# Patient Record
Sex: FEMALE | NOT HISPANIC OR LATINO | URBAN - METROPOLITAN AREA
[De-identification: names, ages, dates, MRNs, and addresses within clinical notes are randomized per-mention and may not be internally consistent; named-entity substitution may affect disease eponyms.]

---

## 2021-06-13 NOTE — PROGRESS NOTES
Chief Complaint - tonsillitis    History of Present Illness - Gricel Falcon is a 20 year old female with chronic tonsillitis and recurrent acute tonsillitis. The patient describes bouts of significant sore throat with swelling of the tonsils. This happens every 4-6 weeks for days at a time. [facility] and [facility], sometimes gets antibiotics. It gets better, but only temporarily. This has been going on for 2 years. The patient has had no positive strep tests in the past few years.  In between acute flairs the patient notes chronic cough, phlegm in throat. Mostly clear mucous. Also noted is snoring. Sleeping is good. No personal or family history of bleeding disorders.    Past Medical History -   healthy    Current Medications -   Current Outpatient Medications:      cetirizine (ZYRTEC) 10 MG tablet, Take 10 mg by mouth daily, Disp: , Rfl:     Allergies - No Known Allergies    Social History -   Social History     Socioeconomic History     Marital status: Single     Spouse name: Not on file     Number of children: Not on file     Years of education: Not on file     Highest education level: Not on file   Occupational History     Not on file   Social Needs     Financial resource strain: Not on file     Food insecurity     Worry: Not on file     Inability: Not on file     Transportation needs     Medical: Not on file     Non-medical: Not on file   Tobacco Use     Smoking status: Not on file   Substance and Sexual Activity     Alcohol use: Not on file     Drug use: Not on file     Sexual activity: Not on file   Lifestyle     Physical activity     Days per week: Not on file     Minutes per session: Not on file     Stress: Not on file   Relationships     Social connections     Talks on phone: Not on file     Gets together: Not on file     Attends Yarsani service: Not on file     Active member of club or organization: Not on file     Attends meetings of clubs or organizations: Not on file      Relationship status: Not on file     Intimate partner violence     Fear of current or ex partner: Not on file     Emotionally abused: Not on file     Physically abused: Not on file     Forced sexual activity: Not on file   Other Topics Concern     Not on file   Social History Narrative     Not on file   nonsmoker    Family History - sister had tonsils and adenoids removed.     Review of Systems - As per HPI and PMHx, otherwise 7 system review of the head and neck is negative.    Physical Exam  Pulse 125   Resp 18   Wt 93 kg (205 lb)   SpO2 100%   General - The patient is in no distress.  Alert and oriented, answers questions and cooperates with examination appropriately.   Voice and Breathing - The patient was breathing comfortably without the use of accessory muscles. There was no wheezing, stridor, or stertor.  The patients voice was clear and strong.  Eyes - Extraocular movements intact. Sclera were not icteric or injected, conjunctiva were pink and moist.  Neurologic - Cranial nerves II-XII are grossly intact. Specifically, the facial nerve is intact, House-Brackmann grade 1 of 6.   Nose - No significant external deformity.  Nasal mucosa is pink and moist with no abnormal mucus.  The septum was midline, turbinates are of normal size and position.  No polyps, masses, or purulence.  Mouth - Examination of the oral cavity showed pink, healthy oral mucosa. No lesions or ulcerations noted.  The tongue was mobile and protrudes midline.  Oropharynx - The walls of the oropharynx were smooth, pink, moist, symmetric, and had no lesions or ulcerations.  The tonsils were 2-3+, no exudate or erythema today. Some excess oropharyngeal mucous, clear or white. The uvula was midline and the palate raised symmetrically.   Neck -  Soft, non-tender. Palpation of the occipital, submental, submandibular, internal jugular chain, and supraclavicular nodes did not demonstrate any abnormal lymph nodes or masses. The parotid glands  were without masses. Palpation of the thyroid was soft and smooth, with no nodules or goiter appreciated.  The trachea was midline.    A/P - Grciel Falcon is a 20 year old female with recurrent acute tonsillitis, and probably developing chronic tonsillitis. She also has an element of laryngopharyngeal reflux disease. It maybe difficult to determine how much each problem is contributing to her symptoms. I recommend prilosec 20 mg daily and diet and lifestyle changes. I gave her a handout. If after the prilosec treatment for 4-6 weeks she is still having tonsil problems we discussed tonsillectomy. The remainder of the visit was spent discussing the procedure.    I explained the risks, benefits, and alternatives of tonsillectomy including, but not, limited to: bleeding, possible need to go back to the OR to control bleeding, pain, and that tonsillectomy will not cure sore throats secondary to other causes such as viral upper respiratory infection and laryngopharyngeal reflux. I also discussed the risks of general anesthesia. I also explained the likely need for narcotic (opioid) pain medication that increases the risk of dependency. The patient will need to wean off the medication as soon as possible, and Tylenol and ibuprofen medication are preferred. Return in 4-6 weeks.      Song Espino MD  Otolaryngology  [facility]

## 2021-06-15 ENCOUNTER — OFFICE VISIT (OUTPATIENT)
Dept: OTOLARYNGOLOGY | Facility: CLINIC | Age: 21
End: 2021-06-15
Payer: COMMERCIAL

## 2021-06-15 VITALS — HEART RATE: 125 BPM | WEIGHT: 205 LBS | OXYGEN SATURATION: 100 % | RESPIRATION RATE: 18 BRPM

## 2021-06-15 DIAGNOSIS — J02.9 SORE THROAT: ICD-10-CM

## 2021-06-15 DIAGNOSIS — K21.9 LPRD (LARYNGOPHARYNGEAL REFLUX DISEASE): Primary | ICD-10-CM

## 2021-06-15 PROCEDURE — 99204 OFFICE O/P NEW MOD 45 MIN: CPT | Performed by: OTOLARYNGOLOGY

## 2021-06-15 RX ORDER — CETIRIZINE HYDROCHLORIDE 10 MG/1
10 TABLET ORAL DAILY
COMMUNITY

## 2021-06-15 NOTE — LETTER
6/15/2021         RE: Gricel Falcon  [address] [id number]        Dear Colleague,    Thank you for referring your patient, Gricel Falcon, to the [facility]. Please see a copy of my visit note below.    Chief Complaint - tonsillitis    History of Present Illness - Gricel Falcon is a 20 year old female with chronic tonsillitis and recurrent acute tonsillitis. The patient describes bouts of significant sore throat with swelling of the tonsils. This happens every 4-6 weeks for days at a time. [facility], sometimes gets antibiotics. It gets better, but only temporarily. This has been going on for 2 years. The patient has had no positive strep tests in the past few years.  In between acute flairs the patient notes chronic cough, phlegm in throat. Mostly clear mucous. Also noted is snoring. Sleeping is good. No personal or family history of bleeding disorders.    Past Medical History -   healthy    Current Medications -   Current Outpatient Medications:      cetirizine (ZYRTEC) 10 MG tablet, Take 10 mg by mouth daily, Disp: , Rfl:     Allergies - No Known Allergies    Social History -   Social History     Socioeconomic History     Marital status: Single     Spouse name: Not on file     Number of children: Not on file     Years of education: Not on file     Highest education level: Not on file   Occupational History     Not on file   Social Needs     Financial resource strain: Not on file     Food insecurity     Worry: Not on file     Inability: Not on file     Transportation needs     Medical: Not on file     Non-medical: Not on file   Tobacco Use     Smoking status: Not on file   Substance and Sexual Activity     Alcohol use: Not on file     Drug use: Not on file     Sexual activity: Not on file   Lifestyle     Physical activity     Days per week: Not on file     Minutes per session: Not on file     Stress: Not on file    Relationships     Social connections     Talks on phone: Not on file     Gets together: Not on file     Attends Confucianism service: Not on file     Active member of club or organization: Not on file     Attends meetings of clubs or organizations: Not on file     Relationship status: Not on file     Intimate partner violence     Fear of current or ex partner: Not on file     Emotionally abused: Not on file     Physically abused: Not on file     Forced sexual activity: Not on file   Other Topics Concern     Not on file   Social History Narrative     Not on file   nonsmoker    Family History - sister had tonsils and adenoids removed.     Review of Systems - As per HPI and PMHx, otherwise 7 system review of the head and neck is negative.    Physical Exam  Pulse 125   Resp 18   Wt 93 kg (205 lb)   SpO2 100%   General - The patient is in no distress.  Alert and oriented, answers questions and cooperates with examination appropriately.   Voice and Breathing - The patient was breathing comfortably without the use of accessory muscles. There was no wheezing, stridor, or stertor.  The patients voice was clear and strong.  Eyes - Extraocular movements intact. Sclera were not icteric or injected, conjunctiva were pink and moist.  Neurologic - Cranial nerves II-XII are grossly intact. Specifically, the facial nerve is intact, House-Brackmann grade 1 of 6.   Nose - No significant external deformity.  Nasal mucosa is pink and moist with no abnormal mucus.  The septum was midline, turbinates are of normal size and position.  No polyps, masses, or purulence.  Mouth - Examination of the oral cavity showed pink, healthy oral mucosa. No lesions or ulcerations noted.  The tongue was mobile and protrudes midline.  Oropharynx - The walls of the oropharynx were smooth, pink, moist, symmetric, and had no lesions or ulcerations.  The tonsils were 2-3+, no exudate or erythema today. Some excess oropharyngeal mucous, clear or white. The  uvula was midline and the palate raised symmetrically.   Neck -  Soft, non-tender. Palpation of the occipital, submental, submandibular, internal jugular chain, and supraclavicular nodes did not demonstrate any abnormal lymph nodes or masses. The parotid glands were without masses. Palpation of the thyroid was soft and smooth, with no nodules or goiter appreciated.  The trachea was midline.    A/P - Gricel Falcon is a 20 year old female with recurrent acute tonsillitis, and probably developing chronic tonsillitis. She also has an element of laryngopharyngeal reflux disease. It maybe difficult to determine how much each problem is contributing to her symptoms. I recommend prilosec 20 mg daily and diet and lifestyle changes. I gave her a handout. If after the prilosec treatment for 4-6 weeks she is still having tonsil problems we discussed tonsillectomy. The remainder of the visit was spent discussing the procedure.    I explained the risks, benefits, and alternatives of tonsillectomy including, but not, limited to: bleeding, possible need to go back to the OR to control bleeding, pain, and that tonsillectomy will not cure sore throats secondary to other causes such as viral upper respiratory infection and laryngopharyngeal reflux. I also discussed the risks of general anesthesia. I also explained the likely need for narcotic (opioid) pain medication that increases the risk of dependency. The patient will need to wean off the medication as soon as possible, and Tylenol and ibuprofen medication are preferred. Return in 4-6 weeks.      Song Espino MD  Otolaryngology  [facility]              Again, thank you for allowing me to participate in the care of your patient.        Sincerely,        Song Espino MD

## 2021-08-04 ENCOUNTER — TELEPHONE (OUTPATIENT)
Dept: DERMATOLOGY | Facility: CLINIC | Age: 21
End: 2021-08-04

## 2021-08-04 ENCOUNTER — OFFICE VISIT (OUTPATIENT)
Dept: DERMATOLOGY | Facility: CLINIC | Age: 21
End: 2021-08-04
Payer: COMMERCIAL

## 2021-08-04 VITALS — DIASTOLIC BLOOD PRESSURE: 80 MMHG | SYSTOLIC BLOOD PRESSURE: 125 MMHG | HEART RATE: 79 BPM

## 2021-08-04 DIAGNOSIS — L70.0 ACNE VULGARIS: Primary | ICD-10-CM

## 2021-08-04 PROCEDURE — 99203 OFFICE O/P NEW LOW 30 MIN: CPT | Performed by: PHYSICIAN ASSISTANT

## 2021-08-04 RX ORDER — TRETINOIN 1 MG/G
CREAM TOPICAL
Qty: 90 G | Refills: 4 | Status: SHIPPED | OUTPATIENT
Start: 2021-08-04

## 2021-08-04 RX ORDER — DOXYCYCLINE HYCLATE 100 MG
TABLET ORAL
Qty: 60 TABLET | Refills: 2 | Status: SHIPPED | OUTPATIENT
Start: 2021-08-04

## 2021-08-04 RX ORDER — TRETINOIN 0.5 MG/G
CREAM TOPICAL
Qty: 45 G | Refills: 3 | Status: SHIPPED | OUTPATIENT
Start: 2021-08-04

## 2021-08-04 RX ORDER — BENZOYL PEROXIDE 100 MG/ML
LIQUID TOPICAL
Qty: 226 G | Refills: 4 | Status: SHIPPED | OUTPATIENT
Start: 2021-08-04

## 2021-08-04 NOTE — PATIENT INSTRUCTIONS
Proper skin care from [facility]:    -Eliminate harsh soaps as they strip the natural oils from the skin, often resulting in dry itchy skin ( i.e. Dial, Zest, Shruti Spring)  -Use mild soaps such as Cetaphil or Dove Sensitive Skin in the shower. You do not need to use soap on arms, legs, and trunk every time you shower unless visibly soiled.   -Avoid hot or cold showers.  -After showering, lightly dry off and apply moisturizing within 2-3 minutes. This will help trap moisture in the skin.   -Aggressive use of a moisturizer at least 1-2 times a day to the entire body (including -Vanicream, Cetaphil, Aquaphor or Cerave) and moisturize hands after every washing.  -We recommend using moisturizers that come in a tub that needs to be scooped out, not a pump. This has more of an oil base. It will hold moisture in your skin much better than a water base moisturizer. The above recommended are non-pore clogging.      Wear a sunscreen with at least SPF 30 on your face, ears, neck and V of the chest daily. Wear sunscreen on other areas of the body if those areas are exposed to the sun throughout the day. Sunscreens can contain physical and/or chemical blockers. Physical blockers are less likely to clog pores, these include zinc oxide and titanium dioxide. Reapply every two hour and after swimming. Sunscreen examples include Neutrogena, CeraVe, Blue Lizard, Elta MD and many others.    UV radiation  UVA radiation remains constant throughout the day and throughout the year. It is a longer wavelength than UVB and therefore penetrates deeper into the skin leading to immediate and delayed tanning, photoaging, and skin cancer. 70-80% of UVA and UVB radiation occurs between the hours of 10am-2pm.  UVB radiation  UVB radiation causes the most harmful effects and is more significant during the summer months. However, snow and ice can reflect UVB radiation leading to skin damage during the winter months as well. UVB radiation is  responsible for tanning, burning, inflammation, delayed erythema (pinkness), pigmentation (brown spots), and skin cancer.     I recommend self monthly full body exams and yearly full body exams with a dermatology provider. If you develop a new or changing lesion please follow up for examination. Most skin cancers are pink and scaly or pink and pearly. However, we do see blue/brown/black skin cancers.  Consider the ABCDEs of melanoma when giving yourself your monthly full body exam ( don't forget the groin, buttocks, feet, toes, etc). A-asymmetry, B-borders, C-color, D-diameter, E-elevation or evolving. If you see any of these changes please follow up in clinic. If you cannot see your back I recommend purchasing a hand held mirror to use with a larger wall mirror.          Acne vulgaris, PIH    Morning regimen:    Wash with either a gentle cleanser such as Cetaphil gentle cleanser or Benzoyl peroxide wash 10%  Apply a gentle moisturizer*  Take Doxycyline with food     Evening regimen:    Wash with either a gentle cleanser such as Cetaphil gentle cleanser or Benzoyl peroxide wash 10%  Apply tretinoin 0.05% to face and tretinoin 0.1% to chest, shoulders and back   Apply a gentle moisturizer (do not need sunscreen at night)  Take Doxycyline with food    *Facial moisturizer (preferably with an SPF of 30 or greater) such as Cetaphil, CeraVe, or Vanicream. Make sure lotion is non-comedogenic. Sunscreen active ingredients: Physical blockers are less likely to clog pores. Examples include titanium dioxide and zinc oxide  Good body moisturizers include Cetaphil, CeraVe, Eucerin, and Vanicream.    Disc Tretinoin at bedtime, dryness, irritation and way to prevent discussed with patient   Benzoyl Peroxide (BPO) wash daily or every other day depending on dryness  (2.5%-5% BPO on face and 5%-10% on chest and back)  Aggressive use of bland emollients discussed with patient   If taking Doxycycline take with food but avoid  calcium-rich foods as this can reduce the efficacy of Doxycycline. Side effects of doxycyline GI upset, esophagitis, and sun sensitivity.   Potential side effects of oral antibiotic N/V/D, rash, allergic reaction, pigment changes, and dizziness.     May take 2-3 months to see 50-70% improvement  May get worse during initial phase of treatment    Pathophysiology discussed with patient and information provided.  I discussed with patient oral versus topical treatments such as oral antibiotics, Spironolactone (Aldactone)(women only),oral contraceptive pills (women only) topical creams,  and over-the-counter treatments.     Acne can be effectively treated, although response may sometimes be slow.   Where possible, avoid excessively humid conditions such as a sauna, working in an unventilated kitchen or tropical vacations.   If you smoke, stop. Nicotine increases sebum retention and increased scale within the follicles, forming comedones (black and whiteheads).    Minimize the application of oils and cosmetics to the affected skin.  Abrasive skin treatments can aggravate acne.   Try not to scratch or pick the spots as this can increase your risk for permanent scarring in the area.   To avoid sunburn, protect your skin outdoors using a broad spectrum (UVB and UVA) sunscreen and protective clothing.  No relationship between particular foods and acne has been proven. However, reports suggest low glycemic and low dairy diet are helpful for some people.    ACNE INFORMATION     Acne is a skin disease affecting oil glands and hair follicles in your skin.  When these pores do not drain properly, hair follicles can becomes clogged and bacteria can be trapped causing inflammation to occur. Clinical manifestations range from mild to severe, such as comedones (whiteheads and blackheads) or cysts.     Several factors contribute to acne:     1. Hormonal- Androgen (a type of hormone) can cause oil glands to enlarges and produces more  sebum (oil).    2. Bacterial- A specific type of bacteria called Propionibacterium acnes are found in these oily follicles and stimulate more inflammation.     3. Genetics- History of family members (parents or siblings)     4. External factors- mechanical trauma, cosmetics, topical steroids or some oral medications.      Combination therapy is the mainstay of treatment given the multiple factors contributing to acne.  The type of treatment is also dependant on whether you are pregnancy or breastfeeding.     TOPICAL TREATMENTS   Topical Antibiotics These medications help prevent growth of bacteria in your pores.   Topical Exfoliating Agent These are drying agents that will help normalize oil production, unplug pores and reduce bacterial growth. (Note: Make sure you discontinue this medication ONE week prior to waxing or your skin may lift.)     ORAL TREATMENTS   Oral Antibiotics: Tetracyclines are the most commonly used oral antibiotics to treat moderate to severe acne. They are safe to take for months at a time. Some side effects to these medications include: sun sensitivity, stomach upset, dizziness and heartburn. If you experience a sudden onset of rash or severe unusual headache, discontinue the medication and contact your clinician immediately.     Spironolactone: This oral medication blocks androgens (hormones that can aggravate acne).  Since this medication is also used as a diuretic, baseline blood work is needed to check your electrolytes and liver function. Do not get pregnant while on this medication as it can cause birth defects. Make sure to drink plenty of water.    Birth Control Pills (Females only). In order to decrease hormonal fluctuations that affect acne, birth control pills such as Sherri  or Emilia  ( and others) can be effective in treating acne.  We advise you to discuss with your OB/GYN or Primary Care Doctor to be screened and to check if you are eligible to be on this pill.     Accutane   (generic: isotretinoin). This oral medication is a retinoid (Vitamin A derivative like Retin-A) used to treat severe acne that does not respond to the above medications. Accutane  can  help clear acne for years and the average period of treatment is five to seven months, however, the duration of treatment may be adjusted based on severity. Some people may need more than one treatment.     ALTERNATIVE TREATMENTS   Microdermabrasion & light chemical peels help improve skin texture, decrease pore size, decrease mild scarring & increase absorption of your topical treatments     Blue Spectrum Light Therapy consists of a concentrated visible light that kills bacteria in the oil ducts. It is safe for pregnancy & nursing. At times, ALA (a topical solution) may be applied prior to the Blue Light exposure in order to enhance light penetration.     Pulse Dye Laser (PDL) decreases inflammation and improves certain acne scars.     Recommended facial cleansers, moisturizers & cosmetics:   Cetaphil  CeraVe  (Note: Look for  non-comeodogenic  cleansers & moisturizers)   Clinique , Prescriptives  make-up

## 2021-08-04 NOTE — TELEPHONE ENCOUNTER
Prior Authorization Specialty Medication Request    Medication/Dose: tretinoin 0.1% cream  ICD code (if different than what is on RX):

## 2021-08-04 NOTE — LETTER
8/4/2021         RE: Gricel Falcon  [address] [facility] [address]        Dear Colleague,    Thank you for referring your patient, Gricel Falcon, to the [facility]. Please see a copy of my visit note below.    HPI:  Gricel Falcon is a 20 year old female patient here today for acne on face, chest, shoulders and back  .  Patient states this has been present for years.  Patient reports the following symptoms: breakout .  Patient reports the following previous treatments: otc with no change.  Patient reports the following modifying factors: none.  Associated symptoms: none. Not worse with menses. Has Nexplanon. Also has dry scaly bumps on arms.  Patient has no other skin complaints today.  Remainder of the HPI, Meds, PMH, Allergies, FH, and SH was reviewed in chart.      History reviewed. No pertinent past medical history.    History reviewed. No pertinent surgical history.     History reviewed. No pertinent family history.    Social History     Socioeconomic History     Marital status: Single     Spouse name: Not on file     Number of children: Not on file     Years of education: Not on file     Highest education level: Not on file   Occupational History     Not on file   Tobacco Use     Smoking status: Never Smoker     Smokeless tobacco: Never Used   Substance and Sexual Activity     Alcohol use: Not on file     Drug use: Not on file     Sexual activity: Not on file   Other Topics Concern     Not on file   Social History Narrative     Not on file     Social Determinants of Health     Financial Resource Strain:      Difficulty of Paying Living Expenses:    Food Insecurity:      Worried About Running Out of Food in the Last Year:      Ran Out of Food in the Last Year:    Transportation Needs:      Lack of Transportation (Medical):      Lack of Transportation (Non-Medical):    Physical Activity:      Days of Exercise per Week:      Minutes of Exercise per Session:     Stress:      Feeling of Stress :    Social Connections:      Frequency of Communication with Friends and Family:      Frequency of Social Gatherings with Friends and Family:      Attends Christian Services:      Active Member of Clubs or Organizations:      Attends Club or Organization Meetings:      Marital Status:    Intimate Partner Violence:      Fear of Current or Ex-Partner:      Emotionally Abused:      Physically Abused:      Sexually Abused:        Outpatient Encounter Medications as of 8/4/2021   Medication Sig Dispense Refill     cetirizine (ZYRTEC) 10 MG tablet Take 10 mg by mouth daily       etonogestrel (NEXPLANON) 68 MG IMPL 1 each by Subdermal route once       omeprazole (PRILOSEC) 20 MG DR capsule Take 1 capsule (20 mg) by mouth daily 30 capsule 1     No facility-administered encounter medications on file as of 8/4/2021.       Review Of Systems:  Skin: acne, bumps  Eyes: negative  Ears/Nose/Throat: negative  Respiratory: No shortness of breath, dyspnea on exertion, cough, or hemoptysis  Cardiovascular: negative  Gastrointestinal: negative  Genitourinary: negative  Musculoskeletal: negative  Neurologic: negative  Psychiatric: negative  Hematologic/Lymphatic/Immunologic: negative  Endocrine: negative      Objective:     /80   Pulse 79   Eyes: Conjunctivae/lids: Normal   ENT: Lips:  Normal  MSK: Normal  Cardiovascular: Peripheral edema none  Pulm: Breathing Normal  Neuro/Psych: Orientation: A/O x 3 Normal; Mood/Affect: Normal, NAD, WDWN  Pt accompanied by: self  Following areas examined: face, neck, shoulders, chest, back  Maria skin type:ii   Findings:  Red/purple 1.0mm scaly papules on lateral arms  Numerous inflammatory papules on chest, face, shoulders and upper back  Light brown/pink smooth macules  Open comedones on face  Assessment and Plan:  1) Acne vulgaris, PIH    Morning regimen:    Wash with either a gentle cleanser such as Cetaphil gentle cleanser or Benzoyl peroxide wash  10%  Apply a gentle moisturizer*  Take Doxycyline with food     Evening regimen:    Wash with either a gentle cleanser such as Cetaphil gentle cleanser or Benzoyl peroxide wash 10%  Apply tretinoin 0.05% to face and tretinoin 0.1% to chest, shoulders and back   Apply a gentle moisturizer (do not need sunscreen at night)  Take Doxycyline with food    *Facial moisturizer (preferably with an SPF of 30 or greater) such as Cetaphil, CeraVe, or Vanicream. Make sure lotion is non-comedogenic. Sunscreen active ingredients: Physical blockers are less likely to clog pores. Examples include titanium dioxide and zinc oxide  Good body moisturizers include Cetaphil, CeraVe, Eucerin, and Vanicream.    Disc Tretinoin at bedtime, dryness, irritation and way to prevent discussed with patient   Benzoyl Peroxide (BPO) wash daily or every other day depending on dryness  (2.5%-5% BPO on face and 5%-10% on chest and back)  Aggressive use of bland emollients discussed with patient   If taking Doxycycline take with food but avoid calcium-rich foods as this can reduce the efficacy of Doxycycline. Side effects of doxycyline GI upset, esophagitis, and sun sensitivity.   Potential side effects of oral antibiotic N/V/D, rash, allergic reaction, pigment changes, and dizziness.     May take 2-3 months to see 50-70% improvement  May get worse during initial phase of treatment    Pathophysiology discussed with patient and information provided.  I discussed with patient oral versus topical treatments such as oral antibiotics, Spironolactone (Aldactone)(women only),oral contraceptive pills (women only) topical creams,  and over-the-counter treatments.     Acne can be effectively treated, although response may sometimes be slow.   Where possible, avoid excessively humid conditions such as a sauna, working in an unventilated kitchen or tropical vacations.   If you smoke, stop. Nicotine increases sebum retention and increased scale within the follicles,  forming comedones (black and whiteheads).    Minimize the application of oils and cosmetics to the affected skin.  Abrasive skin treatments can aggravate acne.   Try not to scratch or pick the spots as this can increase your risk for permanent scarring in the area.   To avoid sunburn, protect your skin outdoors using a broad spectrum (UVB and UVA) sunscreen and protective clothing.  No relationship between particular foods and acne has been proven. However, reports suggest low glycemic and low dairy diet are helpful for some people.  It was a pleasure speaking with Gricel Falcon today.   Follow up in on Thanksgiving mateo. Pt attends college in [place].         Again, thank you for allowing me to participate in the care of your patient.        Sincerely,        Mayte Simmons PA-C

## 2021-08-04 NOTE — PROGRESS NOTES
HPI:  Gricel Falcon is a 20 year old female patient here today for acne on face, chest, shoulders and back  .  Patient states this has been present for years.  Patient reports the following symptoms: breakout .  Patient reports the following previous treatments: otc with no change.  Patient reports the following modifying factors: none.  Associated symptoms: none. Not worse with menses. Has Nexplanon. Also has dry scaly bumps on arms.  Patient has no other skin complaints today.  Remainder of the HPI, Meds, PMH, Allergies, FH, and SH was reviewed in chart.      History reviewed. No pertinent past medical history.    History reviewed. No pertinent surgical history.     History reviewed. No pertinent family history.    Social History     Socioeconomic History     Marital status: Single     Spouse name: Not on file     Number of children: Not on file     Years of education: Not on file     Highest education level: Not on file   Occupational History     Not on file   Tobacco Use     Smoking status: Never Smoker     Smokeless tobacco: Never Used   Substance and Sexual Activity     Alcohol use: Not on file     Drug use: Not on file     Sexual activity: Not on file   Other Topics Concern     Not on file   Social History Narrative     Not on file     Social Determinants of Health     Financial Resource Strain:      Difficulty of Paying Living Expenses:    Food Insecurity:      Worried About Running Out of Food in the Last Year:      Ran Out of Food in the Last Year:    Transportation Needs:      Lack of Transportation (Medical):      Lack of Transportation (Non-Medical):    Physical Activity:      Days of Exercise per Week:      Minutes of Exercise per Session:    Stress:      Feeling of Stress :    Social Connections:      Frequency of Communication with Friends and Family:      Frequency of Social Gatherings with Friends and Family:      Attends [employer]ism Services:      Active Member of Clubs or Organizations:       Attends Club or Organization Meetings:      Marital Status:    Intimate Partner Violence:      Fear of Current or Ex-Partner:      Emotionally Abused:      Physically Abused:      Sexually Abused:        Outpatient Encounter Medications as of 8/4/2021   Medication Sig Dispense Refill     cetirizine (ZYRTEC) 10 MG tablet Take 10 mg by mouth daily       etonogestrel (NEXPLANON) 68 MG IMPL 1 each by Subdermal route once       omeprazole (PRILOSEC) 20 MG DR capsule Take 1 capsule (20 mg) by mouth daily 30 capsule 1     No facility-administered encounter medications on file as of 8/4/2021.       Review Of Systems:  Skin: acne, bumps  Eyes: negative  Ears/Nose/Throat: negative  Respiratory: No shortness of breath, dyspnea on exertion, cough, or hemoptysis  Cardiovascular: negative  Gastrointestinal: negative  Genitourinary: negative  Musculoskeletal: negative  Neurologic: negative  Psychiatric: negative  Hematologic/Lymphatic/Immunologic: negative  Endocrine: negative      Objective:     /80   Pulse 79   Eyes: Conjunctivae/lids: Normal   ENT: Lips:  Normal  MSK: Normal  Cardiovascular: Peripheral edema none  Pulm: Breathing Normal  Neuro/Psych: Orientation: A/O x 3 Normal; Mood/Affect: Normal, NAD, WDWN  Pt accompanied by: self  Following areas examined: face, neck, shoulders, chest, back  Maria skin type:ii   Findings:  Red/purple 1.0mm scaly papules on lateral arms  Numerous inflammatory papules on chest, face, shoulders and upper back  Light brown/pink smooth macules  Open comedones on face  Assessment and Plan:  1) Acne vulgaris, PIH    Morning regimen:    Wash with either a gentle cleanser such as Cetaphil gentle cleanser or Benzoyl peroxide wash 10%  Apply a gentle moisturizer*  Take Doxycyline with food     Evening regimen:    Wash with either a gentle cleanser such as Cetaphil gentle cleanser or Benzoyl peroxide wash 10%  Apply tretinoin 0.05% to face and tretinoin 0.1% to chest, shoulders and back    Apply a gentle moisturizer (do not need sunscreen at night)  Take Doxycyline with food    *Facial moisturizer (preferably with an SPF of 30 or greater) such as Cetaphil, CeraVe, or Vanicream. Make sure lotion is non-comedogenic. Sunscreen active ingredients: Physical blockers are less likely to clog pores. Examples include titanium dioxide and zinc oxide  Good body moisturizers include Cetaphil, CeraVe, Eucerin, and Vanicream.    Disc Tretinoin at bedtime, dryness, irritation and way to prevent discussed with patient   Benzoyl Peroxide (BPO) wash daily or every other day depending on dryness  (2.5%-5% BPO on face and 5%-10% on chest and back)  Aggressive use of bland emollients discussed with patient   If taking Doxycycline take with food but avoid calcium-rich foods as this can reduce the efficacy of Doxycycline. Side effects of doxycyline GI upset, esophagitis, and sun sensitivity.   Potential side effects of oral antibiotic N/V/D, rash, allergic reaction, pigment changes, and dizziness.     May take 2-3 months to see 50-70% improvement  May get worse during initial phase of treatment    Pathophysiology discussed with patient and information provided.  I discussed with patient oral versus topical treatments such as oral antibiotics, Spironolactone (Aldactone)(women only),oral contraceptive pills (women only) topical creams,  and over-the-counter treatments.     Acne can be effectively treated, although response may sometimes be slow.   Where possible, avoid excessively humid conditions such as a sauna, working in an unventilated kitchen or tropical vacations.   If you smoke, stop. Nicotine increases sebum retention and increased scale within the follicles, forming comedones (black and whiteheads).    Minimize the application of oils and cosmetics to the affected skin.  Abrasive skin treatments can aggravate acne.   Try not to scratch or pick the spots as this can increase your risk for permanent scarring in the  area.   To avoid sunburn, protect your skin outdoors using a broad spectrum (UVB and UVA) sunscreen and protective clothing.  No relationship between particular foods and acne has been proven. However, reports suggest low glycemic and low dairy diet are helpful for some people.  It was a pleasure speaking with Gricel Falcon today.   Follow up in on Thanksgiving break. Pt attends college in [place].

## 2021-08-04 NOTE — TELEPHONE ENCOUNTER
PA Initiation    Medication: tretinoin 0.1% cream- INITIATED  Insurance Company: [email or web address] ([facility]) - Phone [phone] Fax [phone]  Pharmacy Filling the Rx: [id number] [place], [place] - [address] AT [facility] [address] [facility]  Filling Pharmacy Phone: [phone]  Filling Pharmacy Fax: [phone]  Start Date: 8/4/2021

## 2021-08-05 NOTE — TELEPHONE ENCOUNTER
Prior Authorization Approval FOR 45 GM. 90 GM DENIED.     Authorization Effective Date: 8/5/2021  Authorization Expiration Date: 8/4/2022  Medication: tretinoin 0.1% cream- PARTIALLY APPROVED  Approved Dose/Quantity: 45 GM  Reference #: [id number]   Insurance Company: [employer] ([facility]) - Phone [phone] Fax [phone]  Expected CoPay:       CoPay Card Available:      Foundation Assistance Needed:    Which Pharmacy is filling the prescription (Not needed for infusion/clinic administered): [id number] [place], [place] - [address] AT [facility] [address] [facility]  Pharmacy Notified: Yes  Patient Notified: Yes        Central Prior Authorization Team  Phone: [phone]

## 2025-02-17 NOTE — PROGRESS NOTES
Chief Complaint   Patient presents with    Consult     Discuss tonsillectomy - issues for the past 5 years, steroids for swelling     History of Present Illness  Gricel Falcon is a 24 year old female who presents today for evaluation. The patient reports a history of chronic tonsillitis and recurrent acute tonsillitis. The patient describes bouts of significant sore throat with swelling of the tonsils. This happens 2-3 times per year, and has been going on for several years. The patient has had multiple strep tests in the past few years. Multiple courses of steroids and antibiotics.  In between acute flairs the patient notes intermittent chronic sore throat.  No personal or family history of bleeding disorders.  She had a reaction to anesthesia when she was 14, she says that she had a hard time falling asleep with anesthesia.  No high fever or postoperative nausea or vomiting.  No family history of problems with anesthesia.    Past Medical History  There is no problem list on file for this patient.    Current Medications     Current Outpatient Medications:     cetirizine (ZYRTEC) 10 MG tablet, Take 10 mg by mouth daily, Disp: , Rfl:     etonogestrel (NEXPLANON) 68 MG IMPL, 1 each by Subdermal route once, Disp: , Rfl:     ibuprofen (ADVIL/MOTRIN) 800 MG tablet, Take 1 tablet by mouth 3 times daily., Disp: , Rfl:     omeprazole (PRILOSEC) 20 MG DR capsule, Take 1 capsule (20 mg) by mouth daily, Disp: 30 capsule, Rfl: 1    ZEPBOUND 2.5 MG/0.5ML prefilled pen, Inject 2.5 mg subcutaneously one time weekly for 4 weeks, Disp: , Rfl:     benzoyl peroxide (BENZOYL PEROXIDE WASH) 10 % external liquid, Wash face, chest, shoulders, back, arms once a day in the shower. (Patient not taking: Reported on 2/26/2025), Disp: 226 g, Rfl: 4    doxycycline hyclate (VIBRA-TABS) 100 MG tablet, Take one by mouth in the AM and one in the PM. Take with food, avoiding high calcium foods. (Patient not taking: Reported on 2/26/2025), Disp:  "60 tablet, Rfl: 2    loperamide (IMODIUM) 2 MG capsule, Take 1 capsule by mouth 4 times daily as needed. (Patient not taking: Reported on 2/26/2025), Disp: , Rfl:     metFORMIN (GLUCOPHAGE) 500 MG tablet, take 1 tablet (500 mg) by mouth twice a day with meals (Patient not taking: Reported on 2/26/2025), Disp: , Rfl:     tretinoin (RETIN-A) 0.05 % external cream, Apply a pea-sized amount to face.  Start every 3-4 nights working up to every night. (Patient not taking: Reported on 2/26/2025), Disp: 45 g, Rfl: 3    tretinoin (RETIN-A) 0.1 % external cream, Apply a pea-sized amount to chest, shoulders, back, arms. Start every other night working up to every night. (Patient not taking: Reported on 2/26/2025), Disp: 90 g, Rfl: 4    Allergies  Allergies   Allergen Reactions    Grass Extracts [Gramineae Pollens] Cough and Other (See Comments)    Other Environmental Allergy Itching       Social History   Social History     Socioeconomic History    Marital status: Single   Tobacco Use    Smoking status: Never    Smokeless tobacco: Never       Family History  History reviewed. No pertinent family history.    Review of Systems  As per HPI and PMHx, otherwise 10+ comprehensive system review is negative.    Physical Exam  /84   Pulse 101   Resp 17   Ht 1.575 m (5' 2\")   Wt 103 kg (227 lb)   SpO2 98%   BMI 41.52 kg/m    GENERAL: The patient is a pleasant, cooperative 24 year old female in no acute distress.  HEAD: Normocephalic, atraumatic. Hair and scalp are normal.  EYES: Pupils are equal, round, reactive to light and accommodation. Extraocular movements are intact. The sclera nonicteric without injection. The extraocular structures are normal.  EARS: Normal shape and symmetry. No tenderness when palpating the mastoid or tragal areas bilaterally. No mastoid erythema or fluctuance. Otoscopic exam on the right reveals a minimal amount of cerumen. The right tympanic membrane is round, intact without evidence of effusion, " good landmarks.  No retraction, granulation, or drainage. Otoscopic exam on the left reveals a minimal amount of cerumen. The left tympanic membrane is round, intact without evidence of effusion, good landmarks.  No retraction, granulation, or drainage.  NOSE: Nares are patent.  Nasal mucosa is pink and moist.  Negative anterior rhinoscopy.  ORAL CAVITY: Lips are normal. Dentition is in good repair. Mucous membranes are moist. Tongue is mobile, protrudes to the midline.  Palate elevates symmetrically. Tonsils are 3+, symmetric. No erythema or exudate. No oral cavity or oropharyngeal masses, lesions, ulcerations, or leukoplakia.  NECK: Supple, trachea is midline. There is no palpable cervical lymphadenopathy or masses bilaterally. Palpation of the bilateral parotid and submandibular areas reveal no masses. No thyromegaly.    NEUROLOGIC: Cranial nerves II through XII are grossly intact. Voice is strong. Patient is House-Brackmann I/VI bilaterally.  CARDIOVASCULAR: Extremities are warm and well-perfused. No significant peripheral edema.  RESPIRATORY: Patient has nonlabored breathing without cough, wheeze, stridor.  PSYCHIATRIC: Patient is alert and oriented. Mood and affect appear normal.  SKIN: Warm and dry. No scalp, face, or neck lesions noted.    Assessment and Plan     ICD-10-CM    1. Chronic tonsillitis  J35.01 Case Request: TONSILLECTOMY WITH ADENOID INPSECTION, POSSIBLE ADENOIDECTOMY      2. Tonsillar hypertrophy  J35.1 Case Request: TONSILLECTOMY WITH ADENOID INPSECTION, POSSIBLE ADENOIDECTOMY      3. History of recurrent tonsillitis  Z87.09 Case Request: TONSILLECTOMY WITH ADENOID INPSECTION, POSSIBLE ADENOIDECTOMY      4. BMI 40.0-44.9, adult (H)  Z68.41         It was my pleasure seeing Gricel Falcon today in clinic. The patient presents with chronic tonsillitis, recurrent acute tonsillitis, and tonsil hypertrophy.  The patient meets AAOHNS criteria for adenotonsillecotmy. The remainder of the visit was  spent discussing the procedure.    We discussed the risks, benefits, alternatives, options of bilateral tonsillectomy and adenoidectomy including, but not, limited to: risk of bleeding in roughly 5% of patients, risk of infection, risk of significant post-operative pain and dehydration, risk of injury to the teeth, tongue, lips, gums, potential need to return to the OR for control bleeding, blood transfusion, risk of general anesthesia.  We discussed potential need for narcotic (opioid) pain medication and risk of dependency.  We discussed the postsurgical convalescence including time off of work or school with both activity and diet restrictions.  The patient  voiced understanding and are willing to proceed.     Jimi Tyler MD  Department of Otolaryngology-Head and Neck Surgery  [facility]

## 2025-02-26 ENCOUNTER — OFFICE VISIT (OUTPATIENT)
Dept: OTOLARYNGOLOGY | Facility: CLINIC | Age: 25
End: 2025-02-26
Payer: COMMERCIAL

## 2025-02-26 VITALS
SYSTOLIC BLOOD PRESSURE: 138 MMHG | HEART RATE: 101 BPM | BODY MASS INDEX: 41.77 KG/M2 | HEIGHT: 62 IN | WEIGHT: 227 LBS | RESPIRATION RATE: 17 BRPM | DIASTOLIC BLOOD PRESSURE: 84 MMHG | OXYGEN SATURATION: 98 %

## 2025-02-26 DIAGNOSIS — Z87.09 HISTORY OF RECURRENT TONSILLITIS: ICD-10-CM

## 2025-02-26 DIAGNOSIS — J35.1 TONSILLAR HYPERTROPHY: ICD-10-CM

## 2025-02-26 DIAGNOSIS — J35.01 CHRONIC TONSILLITIS: Primary | ICD-10-CM

## 2025-02-26 PROCEDURE — 3075F SYST BP GE 130 - 139MM HG: CPT | Performed by: OTOLARYNGOLOGY

## 2025-02-26 PROCEDURE — 99204 OFFICE O/P NEW MOD 45 MIN: CPT | Performed by: OTOLARYNGOLOGY

## 2025-02-26 PROCEDURE — 3079F DIAST BP 80-89 MM HG: CPT | Performed by: OTOLARYNGOLOGY

## 2025-02-26 RX ORDER — IBUPROFEN 800 MG/1
1 TABLET, FILM COATED ORAL
COMMUNITY
Start: 2024-11-05

## 2025-02-26 RX ORDER — TIRZEPATIDE 2.5 MG/.5ML
INJECTION, SOLUTION SUBCUTANEOUS
COMMUNITY
Start: 2025-02-11

## 2025-02-26 RX ORDER — LOPERAMIDE HYDROCHLORIDE 2 MG/1
1 CAPSULE ORAL 4 TIMES DAILY PRN
COMMUNITY

## 2025-02-26 NOTE — LETTER
2/26/2025      Gricel Falcon  [address] [address]      Dear Colleague,    Thank you for referring your patient, Gricel Falcon, to the [facility]. Please see a copy of my visit note below.    Chief Complaint   Patient presents with     Consult     Discuss tonsillectomy - issues for the past 5 years, steroids for swelling     History of Present Illness  Gricel Falcon is a 24 year old female who presents today for evaluation. The patient reports a history of chronic tonsillitis and recurrent acute tonsillitis. The patient describes bouts of significant sore throat with swelling of the tonsils. This happens 2-3 times per year, and has been going on for several years. The patient has had multiple strep tests in the past few years. Multiple courses of steroids and antibiotics.  In between acute flairs the patient notes intermittent chronic sore throat.  No personal or family history of bleeding disorders.  She had a reaction to anesthesia when she was 14, she says that she had a hard time falling asleep with anesthesia.  No high fever or postoperative nausea or vomiting.  No family history of problems with anesthesia.    Past Medical History  There is no problem list on file for this patient.    Current Medications     Current Outpatient Medications:      cetirizine (ZYRTEC) 10 MG tablet, Take 10 mg by mouth daily, Disp: , Rfl:      etonogestrel (NEXPLANON) 68 MG IMPL, 1 each by Subdermal route once, Disp: , Rfl:      ibuprofen (ADVIL/MOTRIN) 800 MG tablet, Take 1 tablet by mouth 3 times daily., Disp: , Rfl:      omeprazole (PRILOSEC) 20 MG DR capsule, Take 1 capsule (20 mg) by mouth daily, Disp: 30 capsule, Rfl: 1     ZEPBOUND 2.5 MG/0.5ML prefilled pen, Inject 2.5 mg subcutaneously one time weekly for 4 weeks, Disp: , Rfl:      benzoyl peroxide (BENZOYL PEROXIDE WASH) 10 % external liquid, Wash face, chest, shoulders, back, arms once a day in the shower. (Patient not  "taking: Reported on 2/26/2025), Disp: 226 g, Rfl: 4     doxycycline hyclate (VIBRA-TABS) 100 MG tablet, Take one by mouth in the AM and one in the PM. Take with food, avoiding high calcium foods. (Patient not taking: Reported on 2/26/2025), Disp: 60 tablet, Rfl: 2     loperamide (IMODIUM) 2 MG capsule, Take 1 capsule by mouth 4 times daily as needed. (Patient not taking: Reported on 2/26/2025), Disp: , Rfl:      metFORMIN (GLUCOPHAGE) 500 MG tablet, take 1 tablet (500 mg) by mouth twice a day with meals (Patient not taking: Reported on 2/26/2025), Disp: , Rfl:      tretinoin (RETIN-A) 0.05 % external cream, Apply a pea-sized amount to face.  Start every 3-4 nights working up to every night. (Patient not taking: Reported on 2/26/2025), Disp: 45 g, Rfl: 3     tretinoin (RETIN-A) 0.1 % external cream, Apply a pea-sized amount to chest, shoulders, back, arms. Start every other night working up to every night. (Patient not taking: Reported on 2/26/2025), Disp: 90 g, Rfl: 4    Allergies  Allergies   Allergen Reactions     Grass Extracts [Gramineae Pollens] Cough and Other (See Comments)     Other Environmental Allergy Itching       Social History   Social History     Socioeconomic History     Marital status: Single   Tobacco Use     Smoking status: Never     Smokeless tobacco: Never       Family History  History reviewed. No pertinent family history.    Review of Systems  As per HPI and PMHx, otherwise 10+ comprehensive system review is negative.    Physical Exam  /84   Pulse 101   Resp 17   Ht 1.575 m (5' 2\")   Wt 103 kg (227 lb)   SpO2 98%   BMI 41.52 kg/m    GENERAL: The patient is a pleasant, cooperative 24 year old female in no acute distress.  HEAD: Normocephalic, atraumatic. Hair and scalp are normal.  EYES: Pupils are equal, round, reactive to light and accommodation. Extraocular movements are intact. The sclera nonicteric without injection. The extraocular structures are normal.  EARS: Normal shape " and symmetry. No tenderness when palpating the mastoid or tragal areas bilaterally. No mastoid erythema or fluctuance. Otoscopic exam on the right reveals a minimal amount of cerumen. The right tympanic membrane is round, intact without evidence of effusion, good landmarks.  No retraction, granulation, or drainage. Otoscopic exam on the left reveals a minimal amount of cerumen. The left tympanic membrane is round, intact without evidence of effusion, good landmarks.  No retraction, granulation, or drainage.  NOSE: Nares are patent.  Nasal mucosa is pink and moist.  Negative anterior rhinoscopy.  ORAL CAVITY: Lips are normal. Dentition is in good repair. Mucous membranes are moist. Tongue is mobile, protrudes to the midline.  Palate elevates symmetrically. Tonsils are 3+, symmetric. No erythema or exudate. No oral cavity or oropharyngeal masses, lesions, ulcerations, or leukoplakia.  NECK: Supple, trachea is midline. There is no palpable cervical lymphadenopathy or masses bilaterally. Palpation of the bilateral parotid and submandibular areas reveal no masses. No thyromegaly.    NEUROLOGIC: Cranial nerves II through XII are grossly intact. Voice is strong. Patient is House-Brackmann I/VI bilaterally.  CARDIOVASCULAR: Extremities are warm and well-perfused. No significant peripheral edema.  RESPIRATORY: Patient has nonlabored breathing without cough, wheeze, stridor.  PSYCHIATRIC: Patient is alert and oriented. Mood and affect appear normal.  SKIN: Warm and dry. No scalp, face, or neck lesions noted.    Assessment and Plan     ICD-10-CM    1. Chronic tonsillitis  J35.01 Case Request: TONSILLECTOMY WITH ADENOID INPSECTION, POSSIBLE ADENOIDECTOMY      2. Tonsillar hypertrophy  J35.1 Case Request: TONSILLECTOMY WITH ADENOID INPSECTION, POSSIBLE ADENOIDECTOMY      3. History of recurrent tonsillitis  Z87.09 Case Request: TONSILLECTOMY WITH ADENOID INPSECTION, POSSIBLE ADENOIDECTOMY      4. BMI 40.0-44.9, adult (H)  Z68.41          It was my pleasure seeing Gricel Falcon today in clinic. The patient presents with chronic tonsillitis, recurrent acute tonsillitis, and tonsil hypertrophy.  The patient meets AAOHNS criteria for adenotonsillecotmy. The remainder of the visit was spent discussing the procedure.    We discussed the risks, benefits, alternatives, options of bilateral tonsillectomy and adenoidectomy including, but not, limited to: risk of bleeding in roughly 5% of patients, risk of infection, risk of significant post-operative pain and dehydration, risk of injury to the teeth, tongue, lips, gums, potential need to return to the OR for control bleeding, blood transfusion, risk of general anesthesia.  We discussed potential need for narcotic (opioid) pain medication and risk of dependency.  We discussed the postsurgical convalescence including time off of work or school with both activity and diet restrictions.  The patient  voiced understanding and are willing to proceed.     Jimi Tyler MD  Department of Otolaryngology-Head and Neck Surgery  [facility]      Again, thank you for allowing me to participate in the care of your patient.        Sincerely,        Jimi Tyler MD    Electronically signed

## 2025-03-05 PROBLEM — J35.01 CHRONIC TONSILLITIS: Status: ACTIVE | Noted: 2025-02-26

## 2025-03-05 PROBLEM — Z87.09 HISTORY OF RECURRENT TONSILLITIS: Status: ACTIVE | Noted: 2025-02-26

## 2025-03-05 PROBLEM — J35.1 TONSILLAR HYPERTROPHY: Status: ACTIVE | Noted: 2025-02-26

## 2025-04-01 ENCOUNTER — OFFICE VISIT (OUTPATIENT)
Dept: FAMILY MEDICINE | Facility: CLINIC | Age: 25
End: 2025-04-01
Payer: COMMERCIAL

## 2025-04-01 VITALS
DIASTOLIC BLOOD PRESSURE: 83 MMHG | HEIGHT: 62 IN | WEIGHT: 219.25 LBS | RESPIRATION RATE: 18 BRPM | BODY MASS INDEX: 40.35 KG/M2 | OXYGEN SATURATION: 98 % | SYSTOLIC BLOOD PRESSURE: 131 MMHG | TEMPERATURE: 97.8 F | HEART RATE: 103 BPM

## 2025-04-01 DIAGNOSIS — J45.20 MILD INTERMITTENT ASTHMA WITHOUT COMPLICATION: ICD-10-CM

## 2025-04-01 DIAGNOSIS — J35.01 CHRONIC TONSILLITIS: ICD-10-CM

## 2025-04-01 DIAGNOSIS — J30.1 SEASONAL ALLERGIC RHINITIS DUE TO POLLEN: ICD-10-CM

## 2025-04-01 DIAGNOSIS — E28.2 PCOS (POLYCYSTIC OVARIAN SYNDROME): ICD-10-CM

## 2025-04-01 DIAGNOSIS — Z01.818 PREOP GENERAL PHYSICAL EXAM: Primary | ICD-10-CM

## 2025-04-01 DIAGNOSIS — E66.01 OBESITY, MORBID, BMI 40.0-49.9 (H): ICD-10-CM

## 2025-04-01 PROBLEM — F90.9 ADHD (ATTENTION DEFICIT HYPERACTIVITY DISORDER): Status: ACTIVE | Noted: 2021-12-21

## 2025-04-01 PROBLEM — F41.9 ANXIETY: Status: ACTIVE | Noted: 2021-12-21

## 2025-04-01 PROBLEM — F32.A DEPRESSION: Status: ACTIVE | Noted: 2021-12-21

## 2025-04-01 PROBLEM — J30.9 ALLERGIC RHINITIS: Status: ACTIVE | Noted: 2021-12-21

## 2025-04-01 PROBLEM — J45.909 ASTHMA: Status: ACTIVE | Noted: 2021-12-21

## 2025-04-01 PROCEDURE — 3079F DIAST BP 80-89 MM HG: CPT | Performed by: PHYSICIAN ASSISTANT

## 2025-04-01 PROCEDURE — 1125F AMNT PAIN NOTED PAIN PRSNT: CPT | Performed by: PHYSICIAN ASSISTANT

## 2025-04-01 PROCEDURE — 3075F SYST BP GE 130 - 139MM HG: CPT | Performed by: PHYSICIAN ASSISTANT

## 2025-04-01 PROCEDURE — 99204 OFFICE O/P NEW MOD 45 MIN: CPT | Performed by: PHYSICIAN ASSISTANT

## 2025-04-01 RX ORDER — ALBUTEROL SULFATE 90 UG/1
2 INHALANT RESPIRATORY (INHALATION) EVERY 6 HOURS PRN
COMMUNITY

## 2025-04-01 ASSESSMENT — PAIN SCALES - GENERAL: PAINLEVEL_OUTOF10: MILD PAIN (3)

## 2025-04-01 NOTE — PATIENT INSTRUCTIONS
How to Take Your Medication Before Surgery  Preoperative Medication Instructions   Antiplatelet or Anticoagulation Medication Instructions   - We reviewed the medication list and the patient is not on an antiplatelet or anticoagulation medications.    Additional Medication Instructions   - metformin: DO NOT TAKE day of surgery.   - GLP-1 Injectable (exenitide, liraglutide, semaglutide, dulaglutide, etc.): DO NOT TAKE 7 days before surgery    - cannabis, marijuana: DO NOT TAKE night before surgery.   - cetirizine and first generation antihistamines: DO NOT TAKE on day of surgery.   - rescue Inhaler: Continue PRN. Bring to hospital on the day of surgery.       Patient Education   Preparing for Your Surgery  For Adults  Getting started  In most cases, a nurse will call to review your health history and instructions. They will give you an arrival time based on your scheduled surgery time. Please be ready to share:  Your doctor's clinic name and phone number  Your medical, surgical, and anesthesia history  A list of allergies and sensitivities  A list of medicines, including herbal treatments and over-the-counter drugs  Whether the patient has a legal guardian (ask how to send us the papers in advance)  Note: You may not receive a call if you were seen at our PAC (Preoperative Assessment Center).  Please tell us if you're pregnant--or if there's any chance you might be pregnant. Some surgeries may injure a fetus (unborn baby), so they require a pregnancy test. Surgeries that are safe for a fetus don't always need a test, and you can choose whether to have one.   Preparing for surgery  Within 10 to 30 days of surgery: Have a pre-op exam (sometimes called an H&P, or History and Physical). This can be done at a clinic or pre-operative center.  If you're having a , you may not need this exam. Talk to your care team.  At your pre-op exam, talk to your care team about all medicines you take. (This includes CBD oil and  any drugs, such as THC, marijuana, and other forms of cannabis.) If you need to stop any medicine before surgery, ask when to start taking it again.  This is for your safety. Many medicines and drugs can make you bleed too much during surgery. Some change how well surgery (anesthesia) drugs work.  Call your insurance company to let them know you're having surgery. (If you don't have insurance, call [phone].)  Call your clinic if there's any change in your health. This includes a scrape or scratch near the surgery site, or any signs of a cold (sore throat, runny nose, cough, rash, fever).  Eating and drinking guidelines  For your safety: Unless your surgeon tells you otherwise, follow the guidelines below.  Eat and drink as normal until 8 hours before you arrive for surgery. After that, no food or milk. You can spit out gum when you arrive.  Drink clear liquids until 2 hours before you arrive. These are liquids you can see through, like water, Gatorade, and Propel Water. They also include plain black coffee and tea (no cream or milk).  No alcohol for 24 hours before you arrive. The night before surgery, stop any drinks that contain THC.  If your care team tells you to take medicine on the morning of surgery, it's okay to take it with a sip of water. No other medicines or drugs are allowed (including CBD oil)--follow your care team's instructions.  If you have questions the day of surgery, call your hospital or surgery center.   Preventing infection  Shower or bathe the night before and the morning of surgery. Follow the instructions your clinic gave you. (If no instructions, use regular soap.)  Don't shave or clip hair near your surgery site. We'll remove the hair if needed.  Don't smoke or vape the morning of surgery. No chewing tobacco for 6 hours before you arrive. A nicotine patch is okay. You may spit out nicotine gum when you arrive.  For some surgeries, the surgeon will tell you to fully quit smoking and  nicotine.  We will make every effort to keep you safe from infection. We will:  Clean our hands often with soap and water (or an alcohol-based hand rub).  Clean the skin at your surgery site with a special soap that kills germs.  Give you a special gown to keep you warm. (Cold raises the risk of infection.)  Wear hair covers, masks, gowns, and gloves during surgery.  Give antibiotic medicine, if prescribed. Not all surgeries need this medicine.  What to bring on the day of surgery  Photo ID and insurance card  Copy of your health care directive, if you have one  Glasses and hearing aids (bring cases)  You can't wear contacts during surgery  Inhaler and eye drops, if you use them (tell us about these when you arrive)  CPAP machine or breathing device, if you use them  A few personal items, if spending the night  If you have . . .  A pacemaker, ICD (cardiac defibrillator), or other implant: Bring the ID card.  An implanted stimulator: Bring the remote control.  A legal guardian: Bring a copy of the certified (court-stamped) guardianship papers.  Please remove any jewelry, including body piercings. Leave jewelry and other valuables at home.  If you're going home the day of surgery  You must have a responsible adult drive you home. They should stay with you overnight as well.  If you don't have someone to stay with you, and you aren't safe to go home alone, we may keep you overnight. Insurance often won't pay for this.  After surgery  If it's hard to control your pain or you need more pain medicine, please call your surgeon's office.  Questions?   If you have any questions for your care team, list them here:   ____________________________________________________________________________________________________________________________________________________________________________________________________________________________________________________________  For informational purposes only. Not to replace the advice of  your health care provider. Copyright   2003, 2019 [facility]. All rights reserved. Clinically reviewed by Andre Hawkins MD. [employer] [id number] - REV 08/24.

## 2025-04-01 NOTE — PROGRESS NOTES
Preoperative Evaluation  [address] [address]  Phone: [phone]  Fax: [phone]  Primary Provider: Linh Naylor NP  Pre-op Performing Provider: LANDY Johnson  Apr 1, 2025 4/1/2025   Surgical Information   What procedure is being done? tonsil removal   Facility or Hospital where procedure/surgery will be performed: [facility]   Who is doing the procedure / surgery? Jimi Tyler MD   Date of surgery / procedure: 4\7   Time of surgery / procedure: 11:55   Where do you plan to recover after surgery? at home with family     Fax number for surgical facility: Note does not need to be faxed, will be available electronically in Epic.    Assessment & Plan     The proposed surgical procedure is considered INTERMEDIATE risk.    Preop general physical exam  Cleared for procedure    Chronic tonsillitis  Surgery schedule for tonsillectomy     PCOS (polycystic ovarian syndrome)  Chronic, stable, continue with current treatment regimen    Seasonal allergic rhinitis due to pollen  Chronic, stable, continue with current treatment regimen    Mild intermittent asthma without complication  Chronic, stable, continue with current treatment regimen    Obesity, morbid, BMI 40.0-49.9 (H)             Risks and Recommendations  The patient has the following additional risks and recommendations for perioperative complications:   - Morbid obesity (BMI >40)    Preoperative Medication Instructions  Antiplatelet or Anticoagulation Medication Instructions   - We reviewed the medication list and the patient is not on an antiplatelet or anticoagulation medications.    Additional Medication Instructions   - metformin: DO NOT TAKE day of surgery.   - GLP-1 Injectable (exenitide, liraglutide, semaglutide, dulaglutide, etc.): DO NOT TAKE 7 days before surgery    - cannabis, marijuana: DO NOT TAKE night before surgery.   - cetirizine and first generation  antihistamines: DO NOT TAKE on day of surgery.   - rescue Inhaler: Continue PRN. Bring to hospital on the day of surgery.    Recommendation  Approval given to proceed with proposed procedure, without further diagnostic evaluation.    Kavitha Monsalve is a 24 year old, presenting for the following:  Pre-Op Exam (/)        HPI: History of chronic tonsillitis, surgery was suggested for removal of tonsils           4/1/2025   Pre-Op Questionnaire   Have you ever had a heart attack or stroke? No   Have you ever had surgery on your heart or blood vessels, such as a stent placement, a coronary artery bypass, or surgery on an artery in your head, neck, heart, or legs? No   Do you have chest pain with activity? No   Do you have a history of heart failure? No   Do you currently have a cold, bronchitis or symptoms of other infection? No   Do you have a cough, shortness of breath, or wheezing? No   Do you or anyone in your family have previous history of blood clots? No   Do you or does anyone in your family have a serious bleeding problem such as prolonged bleeding following surgeries or cuts? No   Have you ever had problems with anemia or been told to take iron pills? (!) YES - history of iron def but no currently    Have you had any abnormal blood loss such as black, tarry or bloody stools, or abnormal vaginal bleeding? No   Have you ever had a blood transfusion? No   Are you willing to have a blood transfusion if it is medically needed before, during, or after your surgery? YES    Have you or any of your relatives ever had problems with anesthesia? No   Do you have sleep apnea, excessive snoring or daytime drowsiness? No   Do you have any artifical heart valves or other implanted medical devices like a pacemaker, defibrillator, or continuous glucose monitor? No   Do you have artificial joints? No   Are you allergic to latex? No     Health Care Directive  Patient does not have a Health Care Directive: Discussed advance  "care planning with patient; information given to patient to review.    Preoperative Review of    reviewed - no record of controlled substances prescribed.      Status of Chronic Conditions:  See Assessment and Plan     Patient Active Problem List    Diagnosis Date Noted    PCOS (polycystic ovarian syndrome) 04/01/2025     Priority: Medium    Chronic tonsillitis 02/26/2025     Priority: Medium    Allergic rhinitis 12/21/2021     Priority: Medium    ADHD (attention deficit hyperactivity disorder) 12/21/2021     Priority: Medium    Anxiety 12/21/2021     Priority: Medium    Depression 12/21/2021     Priority: Medium    Asthma 12/21/2021     Priority: Medium     exercise induced only    exercise induced only        No past medical history on file.  No past surgical history on file.  Current Outpatient Medications   Medication Sig Dispense Refill    cetirizine (ZYRTEC) 10 MG tablet Take 10 mg by mouth daily      etonogestrel (NEXPLANON) 68 MG IMPL 1 each by Subdermal route once      metFORMIN (GLUCOPHAGE) 500 MG tablet       ZEPBOUND 2.5 MG/0.5ML prefilled pen Inject 2.5 mg subcutaneously one time weekly for 4 weeks         Allergies   Allergen Reactions    Grass Extracts [Gramineae Pollens] Cough and Other (See Comments)    Hydroxyzine Itching     Body pains    Other Environmental Allergy Itching    Prednisone Itching     Body aches        Social History     Tobacco Use    Smoking status: Never    Smokeless tobacco: Never   Substance Use Topics    Alcohol use: Not on file       History   Drug Use Not on file             Review of Systems  Constitutional, HEENT, cardiovascular, pulmonary, GI, , musculoskeletal, neuro, skin, endocrine and psych systems are negative, except as otherwise noted.    Objective    /83   Pulse 103   Temp 97.8  F (36.6  C) (Oral)   Resp 18   Ht 1.575 m (5' 2\")   Wt 99.5 kg (219 lb 4 oz)   LMP  (LMP Unknown)   SpO2 98%   BMI 40.10 kg/m     Estimated body mass index is 40.1 " "kg/m  as calculated from the following:    Height as of this encounter: 1.575 m (5' 2\").    Weight as of this encounter: 99.5 kg (219 lb 4 oz).  Physical Exam  GENERAL: alert and no distress  EYES: Eyes grossly normal to inspection, PERRL and conjunctivae and sclerae normal  HENT: ear canals and TM's normal, nose and mouth without ulcers or lesions  NECK: no adenopathy, no asymmetry, masses, or scars  RESP: lungs clear to auscultation - no rales, rhonchi or wheezes  CV: regular rate and rhythm, normal S1 S2, no S3 or S4, no murmur, click or rub, no peripheral edema  ABDOMEN: soft, nontender, no hepatosplenomegaly, no masses and bowel sounds normal  MS: no gross musculoskeletal defects noted, no edema  SKIN: no suspicious lesions or rashes  NEURO: Normal strength and tone, mentation intact and speech normal  PSYCH: mentation appears normal, affect normal/bright    No results for input(s): \"HGB\", \"PLT\", \"INR\", \"NA\", \"POTASSIUM\", \"CR\", \"A1C\" in the last 8760 hours.     Diagnostics  No labs were ordered during this visit.   No EKG required, no history of coronary heart disease, significant arrhythmia, peripheral arterial disease or other structural heart disease.    Revised Cardiac Risk Index (RCRI)  The patient has the following serious cardiovascular risks for perioperative complications:   - No serious cardiac risks = 0 points     RCRI Interpretation: 0 points: Class I (very low risk - 0.4% complication rate)         Signed Electronically by: LANDY Johnson  A copy of this evaluation report is provided to the requesting physician.         "

## 2025-04-04 ENCOUNTER — ANESTHESIA EVENT (OUTPATIENT)
Dept: SURGERY | Facility: AMBULATORY SURGERY CENTER | Age: 25
End: 2025-04-04

## 2025-04-07 ENCOUNTER — ANESTHESIA (OUTPATIENT)
Dept: SURGERY | Facility: AMBULATORY SURGERY CENTER | Age: 25
End: 2025-04-07